# Patient Record
Sex: MALE | Race: WHITE
[De-identification: names, ages, dates, MRNs, and addresses within clinical notes are randomized per-mention and may not be internally consistent; named-entity substitution may affect disease eponyms.]

---

## 2017-09-10 ENCOUNTER — HOSPITAL ENCOUNTER (EMERGENCY)
Dept: HOSPITAL 25 - ED | Age: 62
Discharge: HOME | End: 2017-09-10
Payer: COMMERCIAL

## 2017-09-10 ENCOUNTER — HOSPITAL ENCOUNTER (EMERGENCY)
Dept: HOSPITAL 25 - UCEAST | Age: 62
Discharge: ANOTHER HOSPITAL | End: 2017-09-10
Payer: COMMERCIAL

## 2017-09-10 VITALS — DIASTOLIC BLOOD PRESSURE: 81 MMHG | SYSTOLIC BLOOD PRESSURE: 124 MMHG

## 2017-09-10 DIAGNOSIS — K57.90: ICD-10-CM

## 2017-09-10 DIAGNOSIS — E11.9: ICD-10-CM

## 2017-09-10 DIAGNOSIS — N50.811: ICD-10-CM

## 2017-09-10 DIAGNOSIS — N39.0: Primary | ICD-10-CM

## 2017-09-10 DIAGNOSIS — N40.0: ICD-10-CM

## 2017-09-10 DIAGNOSIS — N45.1: Primary | ICD-10-CM

## 2017-09-10 DIAGNOSIS — R31.9: ICD-10-CM

## 2017-09-10 DIAGNOSIS — Z87.891: ICD-10-CM

## 2017-09-10 DIAGNOSIS — N39.0: ICD-10-CM

## 2017-09-10 DIAGNOSIS — B96.20: ICD-10-CM

## 2017-09-10 LAB
ALBUMIN SERPL BCG-MCNC: 3.9 G/DL (ref 3.2–5.2)
ALP SERPL-CCNC: 61 U/L (ref 34–104)
ALT SERPL W P-5'-P-CCNC: 14 U/L (ref 7–52)
ANION GAP SERPL CALC-SCNC: 10 MMOL/L (ref 2–11)
AST SERPL-CCNC: 12 U/L (ref 13–39)
BUN SERPL-MCNC: 17 MG/DL (ref 6–24)
BUN/CREAT SERPL: 19.5 (ref 8–20)
CALCIUM SERPL-MCNC: 9 MG/DL (ref 8.6–10.3)
CHLORIDE SERPL-SCNC: 97 MMOL/L (ref 101–111)
GLOBULIN SER CALC-MCNC: 3 G/DL (ref 2–4)
GLUCOSE SERPL-MCNC: 160 MG/DL (ref 70–100)
HCO3 SERPL-SCNC: 22 MMOL/L (ref 22–32)
HCT VFR BLD AUTO: 42 % (ref 42–52)
HGB BLD-MCNC: 14.6 G/DL (ref 14–18)
LIPASE SERPL-CCNC: 16 U/L (ref 11–82)
MCH RBC QN AUTO: 32 PG (ref 27–31)
MCHC RBC AUTO-ENTMCNC: 35 G/DL (ref 31–36)
MCV RBC AUTO: 91 FL (ref 80–94)
POTASSIUM SERPL-SCNC: 3.6 MMOL/L (ref 3.5–5)
PROT SERPL-MCNC: 6.9 G/DL (ref 6.4–8.9)
RBC # BLD AUTO: 4.64 10^6/UL (ref 4–5.4)
SODIUM SERPL-SCNC: 129 MMOL/L (ref 133–145)
WBC # BLD AUTO: 13.7 10^3/UL (ref 3.5–10.8)

## 2017-09-10 PROCEDURE — 85025 COMPLETE CBC W/AUTO DIFF WBC: CPT

## 2017-09-10 PROCEDURE — 87077 CULTURE AEROBIC IDENTIFY: CPT

## 2017-09-10 PROCEDURE — 76870 US EXAM SCROTUM: CPT

## 2017-09-10 PROCEDURE — 80053 COMPREHEN METABOLIC PANEL: CPT

## 2017-09-10 PROCEDURE — 36415 COLL VENOUS BLD VENIPUNCTURE: CPT

## 2017-09-10 PROCEDURE — 96375 TX/PRO/DX INJ NEW DRUG ADDON: CPT

## 2017-09-10 PROCEDURE — 96374 THER/PROPH/DIAG INJ IV PUSH: CPT

## 2017-09-10 PROCEDURE — 81003 URINALYSIS AUTO W/O SCOPE: CPT

## 2017-09-10 PROCEDURE — 83605 ASSAY OF LACTIC ACID: CPT

## 2017-09-10 PROCEDURE — 86140 C-REACTIVE PROTEIN: CPT

## 2017-09-10 PROCEDURE — G0463 HOSPITAL OUTPT CLINIC VISIT: HCPCS

## 2017-09-10 PROCEDURE — 87186 SC STD MICRODIL/AGAR DIL: CPT

## 2017-09-10 PROCEDURE — 99285 EMERGENCY DEPT VISIT HI MDM: CPT

## 2017-09-10 PROCEDURE — 81015 MICROSCOPIC EXAM OF URINE: CPT

## 2017-09-10 PROCEDURE — 99211 OFF/OP EST MAY X REQ PHY/QHP: CPT

## 2017-09-10 PROCEDURE — 87491 CHLMYD TRACH DNA AMP PROBE: CPT

## 2017-09-10 PROCEDURE — 87086 URINE CULTURE/COLONY COUNT: CPT

## 2017-09-10 PROCEDURE — 83690 ASSAY OF LIPASE: CPT

## 2017-09-10 PROCEDURE — 96360 HYDRATION IV INFUSION INIT: CPT

## 2017-09-10 PROCEDURE — 74176 CT ABD & PELVIS W/O CONTRAST: CPT

## 2017-09-10 PROCEDURE — 87591 N.GONORRHOEAE DNA AMP PROB: CPT

## 2017-09-10 NOTE — RAD
INDICATION: RIGHT flank and groin pain. Hematuria. RIGHT testicle pain and swelling.

Fever.



COMPARISON:  Testicular ultrasound of the same date.



TECHNIQUE: Multidetector CT images were obtained from the lung bases to the ischial

tuberosities. Evaluation of the viscera is limited without IV contrast. Multiplanar

reformation.



REPORT: Unremarkable visualized inferior thorax. 



20 cm cephalocaudal liver. Subcentimeter cyst at the dome of the liver without concern.

Cholelithiasis without additional CT abnormality of the gallbladder. Negative for biliary

dilatation. No CT abnormality of the pancreas or spleen.



Diverticulum arising from the second and third segments of the duodenum without suggestion

of acute inflammatory change. Unremarkable jejunal and ileal bowel loops. While the

appendix is not discretely visualized, there is no inflammatory change in the right lower

quadrant or region of the tip of the cecum to suggest presence of an acute inflammatory

process. Moderate diverticulosis of the colon without findings of diverticulitis. Negative

for ascites, free air, or significant hernias.



Unremarkable adrenal glands. Unremarkable unenhanced kidneys. Negative for urolithiasis or

hydronephrosis. Negative for ureteral dilatation. Partially distended urinary bladder.

Borderline diffuse prominence of the urinary bladder wall likely reflecting trabeculation.

Enlarged prostate protrudes into the floor of the urinary bladder. Mild asymmetry in the

seminal vesicles with the RIGHT larger than the LEFT.



Negative for lymphadenopathy. Atherosclerotic calcification of normal diameter abdominal

aorta and iliac arteries. Physiologic distention of the IVC.



Small densely sclerotic lesions at the RIGHT femoral head and neck are consistent with

bone islands. Negative for suspicious focal osseous lesions.



IMPRESSION: 

1. Negative for urolithiasis or hydronephrosis.

2. Enlarged prostate protrudes into the floor of the urinary bladder. Borderline diffuse

prominence of the urinary bladder wall likely reflecting trabeculation.

3. While the appendix is not discretely visualized, there is no inflammatory change in the

right lower quadrant or region of the tip of the cecum to suggest presence of an acute

inflammatory process. 

4. Duodenal diverticula without suggestion of acute inflammatory change.

5. Colonic diverticulosis without findings of acute diverticulitis.

## 2017-09-10 NOTE — RAD
Indication: RIGHT testicular pain.



Comparison: No relevant prior exams available on the [facility] PACS for comparison.



Technique: Scrotal ultrasound.



Report: 5.1 x 3.2 x 4.3 cm RIGHT testicle is normal in echotexture and without evidence

for intratesticular lesions. Normal range RIGHT testicular vascularity is symmetric with

the LEFT testicle. 1.2 x 1.4 cm RIGHT epididymis head is remarkable for a small epididymal

head cyst or spermatocele. The RIGHT epididymis body is hyperemic. Moderate RIGHT

hydrocele. Negative for varicocele.



5.8 x 3.1 x 3.5 cm LEFT testicle is normal in echotexture and without evidence for focal

intratesticular lesions. Normal range LEFT testicular vascularity. 1.1 x 1.4 cm LEFT

epididymis head is remarkable for a 0.8 cm maximum dimension epididymal head cyst or

spermatocele without concern. Small LEFT hydrocele. Negative for varicocele.



IMPRESSION: 

1. RIGHT epididymitis.

2. No evidence for orchitis or testicular torsion. Negative for intratesticular lesions.

3. Moderate RIGHT and small LEFT hydroceles.

4. Small bilateral epididymal head cysts or spermatoceles without concern.

## 2017-09-10 NOTE — UC
Complaint Male HPI





- HPI Summary


HPI Summary: 





YESTERDAY WAS PLAYING GOLF FELT PAIN WITH URINATION AND PAIN IN RIGHT TESTICLE. 

DISCOMFORT WITH URINATION AND TESTICLE PAIN CONTINUE. BLOOD IN URINE TODAY. NO 

FEVER. HISTORY OF ENLARGED PROSTATE. TESTICLES SWOLLEN AND SORE. 





- History of Current Complaint


Chief Complaint: UCGU


Stated Complaint: PAINFUL URINATION, AND TENDER TESTACLES


Time Seen by Provider: 09/10/17 16:03


Hx Obtained From: Patient, Family/Caretaker


Onset/Duration: Sudden Onset, Lasting Days, Still Present


Timing: Intermittent


Severity Initially: Moderate


Severity Currently: Moderate


Pain Intensity: 7


Pain Scale Used: 0-10 Numeric


Location: Other - RIGHT TESTICLE & SUPRAPUBIC


Character: Burning


Aggravating Factor(s): Straining


Alleviating Factor(s): Nothing


Associated Signs And Symptoms: Positive: Dysuria, Penile Discharge - URINE.  

Negative: Fever, Constipation, Blood in Stool, Rectal Pain, Nausea, Vomiting(# 

Of Episodes =), Penile Swelling





- Risk Factors


Testicular Torsion: Negative





- Allergies/Home Medications


Allergies/Adverse Reactions: 


 Allergies











Allergy/AdvReac Type Severity Reaction Status Date / Time


 


No Known Allergies Allergy   Verified 01/16/15 13:11











Home Medications: 


 Home Medications





Insulin Glargine [Lantus Solostar 5x3 ML PENS] 50 units SUBCUT DAILY 09/10/17 [

History Confirmed 09/10/17]


Lisinopril [Zestril 10 MG-] 10 mg PO DAILY 09/10/17 [History Confirmed 09/10/17]











PMH/Surg Hx/FS Hx/Imm Hx


Previously Healthy: Yes





- Surgical History


Surgical History: Yes


Surgery Procedure, Year, and Place: [facility]1971, knee surgery left total knee 3/14





- Family History


Known Family History: 


   Negative: Renal Disease





- Social History


Lives: With Family


Alcohol Use: Occasionally


Substance Use Type: None


Smoking Status (MU): Never Smoked Tobacco


Type: Cigars





Review of Systems


Constitutional: Negative


Skin: Negative


Eyes: Negative


ENT: Negative


Respiratory: Negative


Cardiovascular: Negative


Genitourinary: Dysuria, Hematuria, Frequency, Urgency, Other - RIGHT TESTICLE 

PAIN & SCROTAL SWELLING


Motor: Negative


Neurovascular: Negative


Musculoskeletal: Negative


Neurological: Negative


Psychological: Negative


Is Patient Immunocompromised?: Yes


All Other Systems Reviewed And Are Negative: Yes





Physical Exam


Triage Information Reviewed: Yes


Appearance: Well-Appearing, No Pain Distress, Well-Nourished


Vital Signs: 


 Initial Vital Signs











Temp  99.5 F   09/10/17 15:12


 


Pulse  95   09/10/17 15:12


 


Resp  18   09/10/17 15:12


 


BP  124/81   09/10/17 15:12


 


Pulse Ox  97   09/10/17 15:12











Vital Signs Reviewed: Yes


Eye Exam: Normal


ENT Exam: Normal


ENT: Positive: Normal ENT inspection


Dental Exam: Normal


Neck exam: Normal


Neck: Positive: Supple, Nontender, No Lymphadenopathy


Respiratory Exam: Normal


Respiratory: Positive: Chest non-tender, Lungs clear, Normal breath sounds, No 

respiratory distress, No accessory muscle use


Cardiovascular Exam: Normal


Cardiovascular: Positive: RRR, No Murmur, Pulses Normal, Brisk Capillary Refill


Abdominal Exam: Other - SWELLING OF RIGHT SCROTUM; TENDERNESS SUPERIOR POLE 

RIGHT TESTICLE AND NODULE SUPERIOR POLE BILATERAL TESTICLE.


Abdomen Description: Positive: Nontender, No Organomegaly, Soft


Bowel Sounds: Positive: Present


Musculoskeletal Exam: Normal


Neurological Exam: Normal


Psychological Exam: Normal


Skin Exam: Normal





 Complaint Male Course/Dx





- Course


Course Of Treatment: ALTHOUGH FINDINGS SHOWED POSITIVE FOR UTI; ADVISED TO SEEK 

ED EVALUATION FOR SWELLING AND TENDERNESS AND ABNORMAL FINDINGS ON CLINICAL 

EXAM REGARDING TESTICULR PAIN AND SCROTAL SWELLING. PATIENT ADVISED TO SEEK 

HIGHER LEVEL OF CARE AT EMERGENCY DEPARTMENT. PATIENT REFUSED THE OFFER OF 

AMBULANCE TRANSPORT AND ELECTED TO GO TO THE EMERGENCY DEPARTMENT BY PRIVATE 

CAR.





- Differential Dx/Diagnosis


Differential Diagnosis/HQI/PQRI: Epididymitis,  Cancer, Prostatitis, 

Testicular Torsion, Urinary Tract Infection, Other - VERICOCELE; HYDROCELE


Provider Diagnoses: UTI; TESTICULAR PAIN





- Physician Notifications


Instructed by Provider To: MD Will See In ED





Discharge





- Discharge Plan


Condition: Stable


Disposition: OTHER


Discharge Disposition Comment: PATIENT ADVISED TO SEEK HIGHER LEVEL OF CARE AT 

EMERGENCY DEPARTMENT. 


Patient Education Materials:  Urinary Tract Infection in Men (ED), Testicle 

Pain (ED)


Referrals: 


Gutierrez Duran MD [Primary Care Provider] - 


Additional Instructions: 


 ALTHOUGH YOU HAVE URINALYSIS FINDINGS OF A URINARY TRACT INFECTION, YOUR 

TESTICLES ON CLINICAL EXAM AREA TENDER AND SWOLLEN. I ADVISE YOU TO SEEK A 

HIGHER LEVEL OF CARE TO EVALUATE THIS CONCERN. YOU HAVE REFUSED THE OFFER OF 

AMBULANCE TRANSPORT AND HAVE ELECTED TO GO TO THE EMERGENCY DEPARTMENT BY 

PRIVATE CAR.


YOU ARE ADVISED TO PROCEED SAFELY, BUT DIRECTLY TO THE EMERGENCY DEPARTMENT FOR 

CONTINUED EVALUATION.

## 2017-09-11 VITALS — DIASTOLIC BLOOD PRESSURE: 54 MMHG | SYSTOLIC BLOOD PRESSURE: 142 MMHG

## 2017-09-14 NOTE — ED
Luis NUÑEZ Thomas, scribed for Hoang Garcia MD on 09/10/17 at 1922 .





GI/ HPI





- HPI Summary


HPI Summary: 





The pt is a 61 y/o [place] [facility] [place] pain and hematuria. 

While playing 36 holes of golf yesterday, the patient began to develop dysuria 

described as peeing tacks. He soon thereafter had symptoms of left groin pain

, right testicle tenderness, right testicle swelling, and a back ache. He also 

has hematuria described as BRB without clots that began yesterday and it has 

been leaking blood since. The pt rates the pain 6/10. The pain is aggravated 

by urinating and is alleviated by nothing. The patient has treated the pain 

with ibuprofen PTA. Pt additionally c/o sweats, chills, and abdominal 

queasiness. Pt denies vomiting and abd pain. He takes Metformin, Tamsulosin, 

Omeprazole, Lantus Solostar, and Lisinopril. PMHx: DM, BPH. PSHx: appendectomy. 

SHx: former smoker, occasional alcohol use, no illicit drug use. FHx: negative 

for renal disease. 





- History of Current Complaint


Chief Complaint: EDUrogenitalProblems


Time Seen by Provider: 09/10/17 17:27


Stated Complaint: RT TESTICLE SORE/SWOLLEN


Hx Obtained From: Patient, Family/Caretaker - wife is present


Onset/Duration: Started Days Ago - onset yesterday, Still Present


Timing: Constant


Current Severity: Moderate


Pain Intensity: 6


Additional Locations for Males: Testicles


Pain Characteristics: Other: - "pissing tacks"


Pain Radiates to: Back


Associated Signs and Symptoms: Positive: Back Pain, Hematuria, Dysuria - 

yesterday but none today, Chills, Other: - POS: sweats, abdominal queasiness.  

Negative: Vomiting, Abdominal Pain


Aggravating Factor(s): Urination


Alleviating Factor(s): Nothing





- Allergy/Home Medications


Allergies/Adverse Reactions: 


 Allergies











Allergy/AdvReac Type Severity Reaction Status Date / Time


 


No Known Allergies Allergy   Verified 01/16/15 13:11














PMH/Surg Hx/FS Hx/Imm Hx


Previously Healthy: No


Endocrine/Hematology History: Reports: Hx Diabetes - Type 2


 History: Reports: Hx Benign Prostatic Hyperplasia





- Surgical History


Surgery Procedure, Year, and Place: [facility]1971, knee surgery left total knee 3/14


Infectious Disease History: No


Infectious Disease History: 


   Denies: History Other Infectious Disease, Traveled Outside the [place] in Last 30 

Days





- Family History


Known Family History: 


   Negative: Renal Disease





- Social History


Alcohol Use: Occasionally


Substance Use Type: Reports: None


Hx Tobacco Use: Yes


Smoking Status (MU): Former Smoker


Type: Cigars





Review of Systems


Positive: Chills, Other - POS: sweats.  Negative: Fever


Negative: Erythema - eye


Negative: Sore Throat


Negative: Chest Pain


Negative: Shortness Of Breath, Cough


Positive: Other - POS: abdominal queasiness.  Negative: Abdominal Pain, Vomiting

, Nausea


Positive: dysuria - yesterday, none in the ED, hematuria - BRB without clots, 

other - POS: R testicular pain, left groin pain, R testicular tenderness, R 

testicle swelling


Positive: Other - POS: back pain.  Negative: Myalgia, Edema - leg


Negative: Rash


Neurological: Other - NEG: dizziness


All Other Systems Reviewed And Are Negative: Yes





Physical Exam





- Summary


Physical Exam Summary: 





Constitutional: Well-developed, Well-nourished, Alert. (-) Distressed


Skin: Warm, Dry


HENT: Normocephalic; Atraumatic 


Eyes: Conjunctiva normal


Neck: Musculoskeletal ROM normal neck. (-) JVD, (-) Stridor, (-) Tracheal 

deviation


Cardio: Rhythm regular, rate normal, Heart sounds normal; Intact distal pulses; 

The pedal pulses are 2+ and symmetric. Radial pulses are 2+ and symmetric. (-) 

Murmur


Pulmonary/Chest wall: Effort normal. (-) Respiratory distress, (-) Wheezes, (-) 

Rales


Abd: Soft, (-) Tenderness, (-) Distension, (-) Guarding, (-) Rebound


Musculoskeletal: (-) Edema


Lymph: (-) Cervical adenopathy


Neuro: Alert, Oriented x3


Psych: Mood and affect Normal


Rectal: There is an enlarged prostate. There is no prostate tenderness. It is 

boggy. The right testicle is enlarged. 


Triage Information Reviewed: Yes


Vital Signs On Initial Exam: 


 Initial Vitals











Temp Pulse Resp BP Pulse Ox


 


 99.6 F   96   16   142/75   98 


 


 09/10/17 17:14  09/10/17 17:14  09/10/17 17:14  09/10/17 17:14  09/10/17 17:14











Vital Signs Reviewed: Yes





- [place] Coma Scale


Coma Scale Total: 15





Diagnostics





- Vital Signs


 Vital Signs











  Temp Pulse Resp BP Pulse Ox


 


 09/10/17 18:00   94   147/81  95


 


 09/10/17 17:49   95    97


 


 09/10/17 17:48     147/74 


 


 09/10/17 17:30  99.9 F    


 


 09/10/17 17:14  99.6 F  96  16  142/75  98














- Laboratory


Lab Results: 


 Lab Results











  09/10/17 09/10/17 09/10/17 Range/Units





  17:51 18:05 18:05 


 


WBC    13.7 H  (3.5-10.8)  10^3/ul


 


RBC    4.64  (4.0-5.4)  10^6/ul


 


Hgb    14.6  (14.0-18.0)  g/dl


 


Hct    42  (42-52)  %


 


MCV    91  (80-94)  fL


 


MCH    32 H  (27-31)  pg


 


MCHC    35  (31-36)  g/dl


 


RDW    14  (10.5-15)  %


 


Plt Count    188  (150-450)  10^3/ul


 


MPV    8  (7.4-10.4)  um3


 


Neut % (Auto)    81.5  (38-83)  %


 


Lymph % (Auto)    9.0 L  (25-47)  %


 


Mono % (Auto)    8.0  (1-9)  %


 


Eos % (Auto)    0.3  (0-6)  %


 


Baso % (Auto)    1.2  (0-2)  %


 


Absolute Neuts (auto)    11.2 H  (1.5-7.7)  10^3/ul


 


Absolute Lymphs (auto)    1.2  (1.0-4.8)  10^3/ul


 


Absolute Monos (auto)    1.1 H  (0-0.8)  10^3/ul


 


Absolute Eos (auto)    0  (0-0.6)  10^3/ul


 


Absolute Basos (auto)    0.2  (0-0.2)  10^3/ul


 


Absolute Nucleated RBC    0.01  10^3/ul


 


Nucleated RBC %    0  


 


Sodium   129 L   (133-145)  mmol/L


 


Potassium   3.6   (3.5-5.0)  mmol/L


 


Chloride   97 L   (101-111)  mmol/L


 


Carbon Dioxide   22   (22-32)  mmol/L


 


Anion Gap   10   (2-11)  mmol/L


 


BUN   17   (6-24)  mg/dL


 


Creatinine   0.87   (0.67-1.17)  mg/dL


 


Est GFR ( Amer)   114.7   (>60)  


 


Est GFR (Non-Af Amer)   89.2   (>60)  


 


BUN/Creatinine Ratio   19.5   (8-20)  


 


Glucose   160 H   ()  mg/dL


 


Lactic Acid     (0.5-2.0)  mmol/L


 


Calcium   9.0   (8.6-10.3)  mg/dL


 


Total Bilirubin   0.80   (0.2-1.0)  mg/dL


 


AST   12 L   (13-39)  U/L


 


ALT   14   (7-52)  U/L


 


Alkaline Phosphatase   61   ()  U/L


 


C-Reactive Protein   150.17 H   (< 5.00)  mg/L


 


Total Protein   6.9   (6.4-8.9)  g/dL


 


Albumin   3.9   (3.2-5.2)  g/dL


 


Globulin   3.0   (2-4)  g/dL


 


Albumin/Globulin Ratio   1.3   (1-3)  


 


Lipase   16   (11.0-82.0)  U/L


 


Urine Color  Yellow    


 


Urine Appearance  Cloudy    


 


Urine pH  5.0    (5-9)  


 


Ur Specific Gravity  1.025    (1.010-1.030)  


 


Urine Protein  1+(30 mg/dl) H    (Negative)  


 


Urine Ketones  Negative    (Negative)  


 


Urine Blood  3+ H    (Negative)  


 


Urine Nitrate  Negative    (Negative)  


 


Urine Bilirubin  Negative    (Negative)  


 


Urine Urobilinogen  Negative    (Negative)  


 


Ur Leukocyte Esterase  2+ H    (Negative)  


 


Urine WBC (Auto)  3+(>20/hpf) H    (Absent)  


 


Urine RBC (Auto)  3+(>10/hpf) H    (Absent)  


 


Urine Bacteria  1+ H    (Absent)  


 


Urine Glucose  Negative    (Negative)  














  09/10/17 Range/Units





  18:05 


 


WBC   (3.5-10.8)  10^3/ul


 


RBC   (4.0-5.4)  10^6/ul


 


Hgb   (14.0-18.0)  g/dl


 


Hct   (42-52)  %


 


MCV   (80-94)  fL


 


MCH   (27-31)  pg


 


MCHC   (31-36)  g/dl


 


RDW   (10.5-15)  %


 


Plt Count   (150-450)  10^3/ul


 


MPV   (7.4-10.4)  um3


 


Neut % (Auto)   (38-83)  %


 


Lymph % (Auto)   (25-47)  %


 


Mono % (Auto)   (1-9)  %


 


Eos % (Auto)   (0-6)  %


 


Baso % (Auto)   (0-2)  %


 


Absolute Neuts (auto)   (1.5-7.7)  10^3/ul


 


Absolute Lymphs (auto)   (1.0-4.8)  10^3/ul


 


Absolute Monos (auto)   (0-0.8)  10^3/ul


 


Absolute Eos (auto)   (0-0.6)  10^3/ul


 


Absolute Basos (auto)   (0-0.2)  10^3/ul


 


Absolute Nucleated RBC   10^3/ul


 


Nucleated RBC %   


 


Sodium   (133-145)  mmol/L


 


Potassium   (3.5-5.0)  mmol/L


 


Chloride   (101-111)  mmol/L


 


Carbon Dioxide   (22-32)  mmol/L


 


Anion Gap   (2-11)  mmol/L


 


BUN   (6-24)  mg/dL


 


Creatinine   (0.67-1.17)  mg/dL


 


Est GFR ( Amer)   (>60)  


 


Est GFR (Non-Af Amer)   (>60)  


 


BUN/Creatinine Ratio   (8-20)  


 


Glucose   ()  mg/dL


 


Lactic Acid  0.7  (0.5-2.0)  mmol/L


 


Calcium   (8.6-10.3)  mg/dL


 


Total Bilirubin   (0.2-1.0)  mg/dL


 


AST   (13-39)  U/L


 


ALT   (7-52)  U/L


 


Alkaline Phosphatase   ()  U/L


 


C-Reactive Protein   (< 5.00)  mg/L


 


Total Protein   (6.4-8.9)  g/dL


 


Albumin   (3.2-5.2)  g/dL


 


Globulin   (2-4)  g/dL


 


Albumin/Globulin Ratio   (1-3)  


 


Lipase   (11.0-82.0)  U/L


 


Urine Color   


 


Urine Appearance   


 


Urine pH   (5-9)  


 


Ur Specific Gravity   (1.010-1.030)  


 


Urine Protein   (Negative)  


 


Urine Ketones   (Negative)  


 


Urine Blood   (Negative)  


 


Urine Nitrate   (Negative)  


 


Urine Bilirubin   (Negative)  


 


Urine Urobilinogen   (Negative)  


 


Ur Leukocyte Esterase   (Negative)  


 


Urine WBC (Auto)   (Absent)  


 


Urine RBC (Auto)   (Absent)  


 


Urine Bacteria   (Absent)  


 


Urine Glucose   (Negative)  











Result Diagrams: 


 09/10/17 18:05





 09/10/17 18:05


Lab Statement: Any lab studies that have been ordered have been reviewed, and 

results considered in the medical decision making process.





- CT


  ** CT Abd/Pel


CT Interpretation: Positive (See Comments) - CT Abd/Pel reveals 1. Negative for 

urolithiasis or hydronephrosis. 2. Enlarged prostate protrudes into the floor 

of the urinary bladder. Borderline diffuse prominence of the urinary bladder 

wall likely reflecting trabeculation. 3. While the appendix is not discretely 

visualized, there is no inflammatory change in the right lower quadrant or 

region of the tip of the cecum to suggest presence of an acute inflammatory 

process.  4. Duodenal diverticula without suggestion of acute inflammatory 

change. 5. Colonic diverticulosis without findings of acute diverticulitis. ED 

Physician has read this report and agrees.


CT Interpretation Completed By: Radiologist





- Additional Comments


Diagnostic Additional Comments: 





US Testicular. Interpreted by radiologist. Impression: 1. RIGHT epididymitis. 

2. No evidence for orchitis or testicular torsion. Negative for intratesticular 

lesions. 3. Moderate RIGHT and small LEFT hydroceles. 4. Small bilateral 

epididymal head cysts or spermatoceles without concern. ED Physician has read 

this report and agrees. 





Re-Evaluation





- Re-Evaluation


  ** First Eval


Re-Evaluation Time: 23:18


Change: Improved


Comment: He is feeling better. His temperature is 100.8.





GIGU Course/Dx





- Course


Assessment/Plan: The pt is a 61 y/o M [place] [facility] [place] 

pain, dysuria, and hematuria. He has symptoms of left groin pain, right 

testicle tenderness, right testicle swelling, and a back ache. He also has 

hematuria described as BRB without clots that began yesterday Pt additionally c/

o sweats, chills, and abdominal queasiness. In the ED course the patient was 

given IV fluids, Zofran, Morphine, and Rocephin. Bloodwork shows 13.7 WBC, 

Soidum 129, Chloride 97, Glucose 160, AST 12, and .17. UA shows 1+ 

protein, 3+ blood, 2+ leukocyte esterase, 3+ WBC, 3+ RBC, and 1+ bacteria. US 

Testicular reveals 1. RIGHT epididymitis. 2. No evidence for orchitis or 

testicular torsion. Negative for intratesticular lesions. 3. Moderate RIGHT and 

small LEFT hydroceles. 4. Small bilateral epididymal head cysts or 

spermatoceles without concern. CT Abd/Pel reveals 1. Negative for urolithiasis 

or hydronephrosis. 2. Enlarged prostate protrudes into the floor of the urinary 

bladder. Borderline diffuse prominence of the urinary bladder wall likely 

reflecting trabeculation. 3. While the appendix is not discretely visualized, 

there is no inflammatory change in the right lower quadrant or region of the 

tip of the cecum to suggest presence of an acute inflammatory process.  4. 

Duodenal diverticula without suggestion of acute inflammatory change. 5. 

Colonic diverticulosis without findings of acute diverticulitis. ED Physician 

has read this report and agrees. I discussed patient care with Dr. Frankenberg, 

hospitalist. I consulted with Dr. Carpenter, urology, who agrees with Rocephin 

2g. He recommends a bladder scan after the patient voids and for the patient to 

follow up at his office. US bladder shows 124ml of urine post void. At re-

evaluation at 23:08, he is feeling better and his temperature is 100.8. He is 

diagnosed with UTI and epididymitis. He will be discharged home with follow up 

by urology.





- Diagnoses


Provider Diagnoses: 


 UTI (urinary tract infection), Epididymitis








- Physician Notifications


Discussed Care Of Patient With: Fred Frankenberg


Time Discussed With Above Provider: 21:10


Instructed by Provider To: Other - I discussed patient care with Dr. Frankenberg

, hospitalist. I consulted at 21:33 with Dr. Carpenter, urology, who agrees with 

Rocephin 2 grams. He recommends a bladder scan after the patient voids and for 

the patient to follow up at his office.





Discharge





- Discharge Plan


Condition: Stable


Disposition: HOME


Prescriptions: 


Ciprofloxacin TAB* [Cipro 500 MG TAB*] 500 mg PO BID #40 tab


oxyCODONE/Acetamin 5/325 MG* [Percocet 5/325 TAB*] 1 tab PO Q4H PRN #20 tab MDD 

6


 PRN Reason: Pain - Moderate To Severe


Patient Education Materials:  Epididymitis (ED), Urinary Tract Infection in Men 

(ED)


Referrals: 


Bertin Carpenter MD [Medical Doctor] - 2 Days


Additional Instructions: 


Follow up with Dr. Carpenter, a urologist, in two days. 





Return to the emergency department for any new or worsening symptoms. 





The documentation as recorded by the Luis gallo Thomas accurately reflects 

the service I personally performed and the decisions made by me, Hoang Garcia MD.

## 2018-01-01 ENCOUNTER — HOSPITAL ENCOUNTER (EMERGENCY)
Dept: HOSPITAL 25 - ED | Age: 63
Discharge: HOME | End: 2018-01-01
Payer: COMMERCIAL

## 2018-01-01 VITALS — SYSTOLIC BLOOD PRESSURE: 158 MMHG | DIASTOLIC BLOOD PRESSURE: 94 MMHG
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
## 2018-01-01 DIAGNOSIS — S96.911A: Primary | ICD-10-CM

## 2018-01-01 DIAGNOSIS — M25.571: ICD-10-CM

## 2018-01-01 PROCEDURE — 99282 EMERGENCY DEPT VISIT SF MDM: CPT

## 2018-01-01 NOTE — ED
Lower Extremity





- HPI Summary


HPI Summary: 





Patient presents to the ED with CC of right ankle pain after 1 hour arrival of 

injury.  States he was snow blowing neighbors driveway and went backwards over 

a small 2 foot tall wall, right ankle twisted and the  weight was on 

that ankle at the same time, causing increased pain and a "snap in right ankle.

   Denies numbness, tingling or temperature changes.  Otherwise healthy.  

Denies blood thinners.  He has a strong history of ankle injury in the past 

which has a notable deformity (swelling) and he feels he may have injured it 

further.





- History of Current Complaint


Chief Complaint: EDExtremityLower


Stated Complaint: FALL


Time Seen by Provider: 01/01/18 17:08


Hx Obtained From: Patient


Mechanism Of Injury: Twisted


Onset of Pain: Minutes


Onset/Duration: Minutes


Severity Initially: Moderate


Severity Currently: Moderate


Pain Intensity: 2


Pain Scale Used: 0-10 Numeric


Timing: Constant


Location: Is Discrete @ - right lateral ankle


Associated Signs And Symptoms: Positive: Swelling, Redness, Bruising


Aggravating Factor(s): Standing, Ambulation


Alleviating Factor(s): Rest, Elevation


Able to Bear Weight: Yes





- Risk Factors


Gout Risk Factors: Negative


DVT Risk Factors: Negative


Septic Arthritis Risk Factor: Negative





- Allergies/Home Medications


Allergies/Adverse Reactions: 


 Allergies











Allergy/AdvReac Type Severity Reaction Status Date / Time


 


No Known Allergies Allergy   Verified 01/16/15 13:11














PMH/Surg Hx/FS Hx/Imm Hx


Previously Healthy: Yes


Endocrine/Hematology History: Reports: Hx Diabetes - Type 2


 History: Reports: Hx Benign Prostatic Hyperplasia





- Surgical History


Surgery Procedure, Year, and Place: [facility], knee surgery left total knee 3/14





- Immunization History


Hx Pertussis Vaccination: No


Immunizations Up to Date: Unable to Obtain/Confirm


Infectious Disease History: No


Infectious Disease History: 


   Denies: History Other Infectious Disease, Traveled Outside the [place] in Last 30 

Days





- Family History


Known Family History: 


   Negative: Renal Disease





- Social History


Occupation: Employed Full-time


Lives: With Family


Alcohol Use: Occasionally


Hx Substance Use: No


Substance Use Type: Reports: None


Hx Tobacco Use: Yes


Smoking Status (MU): Former Smoker


Type: Cigars





Review of Systems


Constitutional: Negative


Negative: Fever, Chills, Fatigue


Eyes: Negative


Cardiovascular: Negative


Respiratory: Negative


Positive: no symptoms reported, see HPI


Positive: Arthralgia - right ankle 


Skin: Negative


Neurological: Negative


All Other Systems Reviewed And Are Negative: Yes





Physical Exam


Triage Information Reviewed: Yes


Vital Signs On Initial Exam: 


 Initial Vitals











Temp Pulse Resp BP Pulse Ox


 


 98.3 F   104   17   158/94   94 


 


 01/01/18 16:59  01/01/18 16:59  01/01/18 16:59  01/01/18 16:59  01/01/18 16:59











Completion Of Physical Exam Limited Due To: Dementia


Appearance: Positive: Well-Appearing, Well-Nourished


Skin: Positive: Warm, Skin Color Reflects Adequate Perfusion


Head/Face: Positive: Normal Head/Face Inspection


Eyes: Positive: EOMI, EMELY, Conjunctiva Clear


Neck: Positive: Supple, No Lymphadenopathy


Respiratory/Lung Sounds: Positive: Clear to Auscultation, Breath Sounds Present


Cardiovascular: Positive: RRR, Pulses are Symmetrical in both Upper and Lower 

Extremities.  Negative: IRR, Leg Edema Left, Leg Edema Right


Musculoskeletal: Positive: Pain @ - right ankle with feeling of weakness, no 

pain currently


Neurological: Positive: Speech Normal


Psychiatric: Positive: Normal, Affect/Mood Appropriate





Diagnostics





- Vital Signs


 Vital Signs











  Temp Pulse Resp BP Pulse Ox


 


 01/01/18 16:59  98.3 F  104  17  158/94  94














- Laboratory


Lab Statement: Any lab studies that have been ordered have been reviewed, and 

results considered in the medical decision making process.





Lower Extremity Course/Dx





- Course


Course Of Treatment: During the course of treatment, patient is sent to xray.  

Report: Negative for acute fracture or articular malalignment. Probable 

chronic.  posttraumatic deformity at the caudal margin of the lateral 

malleolus. Multiple medial and.  lateral malleolus accessory ossicles which may 

be congenital or reflect sequela of.  previous trauma. Advanced talocrural 

joint osteophytosis most marked posteriorly. Mild to.  moderate joint space 

narrowing. Small Achilles tendon insertion bone spur. Soft tissue.  swelling 

most prominent over the lateral malleolus.  IMPRESSION: Negative for acute 

fracture or malalignment. Degenerative arthropathy and.  sequela of previous 

traumatic injury as described.  Patient is encouraged to follow up with Dr. Abbasi for further evaluation.





- Diagnoses


Provider Diagnoses: 


 Ankle strain








Discharge





- Discharge Plan


Condition: Stable


Disposition: HOME


Patient Education Materials:  Ankle Sprain (ED)


Referrals: 


Gutierrez Duran MD [Primary Care Provider] - 


Kurt Cosby MD [Medical Doctor] - 3 Days


Additional Instructions: 


Please follow up with Dr. Cosby.


Ice


Elevate the leg

## 2018-01-01 NOTE — RAD
Indication: Pain following twisting injury RIGHT ankle. Previous lateral RIGHT ankle

fracture.



Comparison: No relevant prior exams available on the [facility] PACS for comparison.



Technique: AP, mortise, and lateral views RIGHT ankle.



Report: Negative for acute fracture or articular malalignment. Probable chronic

posttraumatic deformity at the caudal margin of the lateral malleolus. Multiple medial and

lateral malleolus accessory ossicles which may be congenital or reflect sequela of

previous trauma. Advanced talocrural joint osteophytosis most marked posteriorly. Mild to

moderate joint space narrowing. Small Achilles tendon insertion bone spur. Soft tissue

swelling most prominent over the lateral malleolus.



IMPRESSION: Negative for acute fracture or malalignment. Degenerative arthropathy and

sequela of previous traumatic injury as described.